# Patient Record
Sex: FEMALE | Race: WHITE
[De-identification: names, ages, dates, MRNs, and addresses within clinical notes are randomized per-mention and may not be internally consistent; named-entity substitution may affect disease eponyms.]

---

## 2017-05-26 ENCOUNTER — HEALTH MAINTENANCE LETTER (OUTPATIENT)
Age: 42
End: 2017-05-26

## 2017-06-09 ENCOUNTER — TELEPHONE (OUTPATIENT)
Dept: FAMILY MEDICINE | Facility: CLINIC | Age: 42
End: 2017-06-09

## 2017-06-09 NOTE — TELEPHONE ENCOUNTER
Pt is past due for f/u pap smear.  Reminder letter was sent 09/07/16.  LMTC and schedule at [facility].  Left this writer's number in case of questions ([phone]).  If no reply and/or appt within 2 weeks (06/23/17) pt will be considered lost to pap tracking f/u.  Xin Avila,    Pap Tracking

## 2018-03-30 ENCOUNTER — TELEPHONE (OUTPATIENT)
Dept: FAMILY MEDICINE | Facility: CLINIC | Age: 43
End: 2018-03-30

## 2018-03-31 NOTE — TELEPHONE ENCOUNTER
Per outreach, patient is aware of overdue screening and will call on their own time for scheduling.     Thanks

## 2018-06-01 ENCOUNTER — HEALTH MAINTENANCE LETTER (OUTPATIENT)
Age: 43
End: 2018-06-01